# Patient Record
Sex: FEMALE | URBAN - METROPOLITAN AREA
[De-identification: names, ages, dates, MRNs, and addresses within clinical notes are randomized per-mention and may not be internally consistent; named-entity substitution may affect disease eponyms.]

---

## 2024-08-05 NOTE — PROGRESS NOTES
Chief Complaint:   Continent ileostomy    History Of Present Illness  Yesenia Emery is a 64 y.o. female with history of MUC s/p restorative proctocolectomy in 2020. Converted to K pouch c. Dr. Francis in 2022 due to complications/poor function. Here today to establish care. Has recently moved from [place] to [place], [place]. Only issues are slightly tight stoma and occasional difficult intubation. No incontinence. She is very happy with the QoL afforded by her K pouch.    Past Medical History  She has no past medical history on file.    Surgical History  She has no past surgical history on file.     Social History  She has no history on file for tobacco use, alcohol use, and drug use.    Family History  No family history on file.     Allergies  Patient has no allergy information on record.    Home Medications  Prior to Admission medications    Not on File         Review of Systems   Constitutional:  Negative for activity change, appetite change, fatigue, fever and unexpected weight change.   HENT:  Negative for sore throat.    Eyes:  Negative for visual disturbance.   Respiratory:  Negative for shortness of breath.    Cardiovascular:  Negative for chest pain, palpitations and leg swelling.   Gastrointestinal:  Negative for abdominal distention, abdominal pain, anal bleeding, blood in stool, constipation, diarrhea, nausea, rectal pain and vomiting.   Endocrine: Negative for polydipsia.   Genitourinary:  Negative for difficulty urinating and dysuria.   Musculoskeletal:  Negative for arthralgias.   Skin:  Negative for wound.   Neurological:  Negative for dizziness, weakness and light-headedness.   Hematological:  Negative for adenopathy.   Psychiatric/Behavioral:  Negative for confusion.          Imaging:  No results found.       Physical Exam       Last Recorded Vitals  /72 (BP Location: Left arm, Patient Position: Sitting, BP Cuff Size: Adult)   Pulse 61   Temp 36.2 °C (97.1 °F) (Temporal)   Ht 1.702 m (5'  "7\")   Wt 66.6 kg (146 lb 14.4 oz)   BMI 23.01 kg/m²         Assessment/Plan  History of MUC s/p failed RP. Now with continent ileostomy and happy with QoL. She will follow up with either Dr. Francis or myself for annual surveillance pouchoscopy this fall.    Dr. Lionel Bowling       "

## 2024-08-06 ENCOUNTER — APPOINTMENT (OUTPATIENT)
Dept: SURGERY | Facility: CLINIC | Age: 64
End: 2024-08-06
Payer: MEDICARE

## 2024-08-06 VITALS
SYSTOLIC BLOOD PRESSURE: 108 MMHG | BODY MASS INDEX: 23.06 KG/M2 | WEIGHT: 146.9 LBS | OXYGEN SATURATION: 100 % | HEIGHT: 67 IN | HEART RATE: 61 BPM | DIASTOLIC BLOOD PRESSURE: 72 MMHG | TEMPERATURE: 97.1 F

## 2024-08-06 DIAGNOSIS — Z93.2 ILEOSTOMY IN PLACE (MULTI): Primary | ICD-10-CM

## 2024-08-06 PROCEDURE — 1036F TOBACCO NON-USER: CPT | Performed by: COLON & RECTAL SURGERY

## 2024-08-06 PROCEDURE — 1126F AMNT PAIN NOTED NONE PRSNT: CPT | Performed by: COLON & RECTAL SURGERY

## 2024-08-06 PROCEDURE — 1159F MED LIST DOCD IN RCRD: CPT | Performed by: COLON & RECTAL SURGERY

## 2024-08-06 PROCEDURE — 3008F BODY MASS INDEX DOCD: CPT | Performed by: COLON & RECTAL SURGERY

## 2024-08-06 PROCEDURE — 99202 OFFICE O/P NEW SF 15 MIN: CPT | Performed by: COLON & RECTAL SURGERY

## 2024-08-06 ASSESSMENT — ENCOUNTER SYMPTOMS
LOSS OF SENSATION IN FEET: 0
FATIGUE: 0
APPETITE CHANGE: 0
ABDOMINAL DISTENTION: 0
PALPITATIONS: 0
DIZZINESS: 0
DEPRESSION: 0
ACTIVITY CHANGE: 0
POLYDIPSIA: 0
FEVER: 0
WEAKNESS: 0
CONSTIPATION: 0
DYSURIA: 0
CONFUSION: 0
RECTAL PAIN: 0
ABDOMINAL PAIN: 0
VOMITING: 0
DIFFICULTY URINATING: 0
SHORTNESS OF BREATH: 0
OCCASIONAL FEELINGS OF UNSTEADINESS: 0
ANAL BLEEDING: 0
ARTHRALGIAS: 0
ADENOPATHY: 0
BLOOD IN STOOL: 0
SORE THROAT: 0
LIGHT-HEADEDNESS: 0
DIARRHEA: 0
NAUSEA: 0
WOUND: 0
UNEXPECTED WEIGHT CHANGE: 0

## 2024-08-06 ASSESSMENT — PATIENT HEALTH QUESTIONNAIRE - PHQ9
SUM OF ALL RESPONSES TO PHQ9 QUESTIONS 1 & 2: 0
1. LITTLE INTEREST OR PLEASURE IN DOING THINGS: NOT AT ALL
2. FEELING DOWN, DEPRESSED OR HOPELESS: NOT AT ALL

## 2024-08-06 ASSESSMENT — PAIN SCALES - GENERAL: PAINLEVEL: 0-NO PAIN
